# Patient Record
Sex: MALE | Race: WHITE | HISPANIC OR LATINO | Employment: FULL TIME | URBAN - METROPOLITAN AREA
[De-identification: names, ages, dates, MRNs, and addresses within clinical notes are randomized per-mention and may not be internally consistent; named-entity substitution may affect disease eponyms.]

---

## 2017-06-27 ENCOUNTER — OFFICE VISIT (OUTPATIENT)
Dept: URGENT CARE | Facility: CLINIC | Age: 22
End: 2017-06-27
Payer: COMMERCIAL

## 2017-06-27 VITALS
BODY MASS INDEX: 21.47 KG/M2 | HEIGHT: 70 IN | DIASTOLIC BLOOD PRESSURE: 66 MMHG | SYSTOLIC BLOOD PRESSURE: 114 MMHG | WEIGHT: 150 LBS | RESPIRATION RATE: 16 BRPM | OXYGEN SATURATION: 98 % | HEART RATE: 78 BPM | TEMPERATURE: 98.2 F

## 2017-06-27 DIAGNOSIS — H65.04 RECURRENT ACUTE SEROUS OTITIS MEDIA OF RIGHT EAR: ICD-10-CM

## 2017-06-27 PROCEDURE — 99202 OFFICE O/P NEW SF 15 MIN: CPT | Performed by: NURSE PRACTITIONER

## 2017-06-27 RX ORDER — FLUTICASONE PROPIONATE 50 MCG
1 SPRAY, SUSPENSION (ML) NASAL DAILY
Qty: 16 G | Refills: 0 | Status: SHIPPED | OUTPATIENT
Start: 2017-06-27 | End: 2019-08-03

## 2017-06-27 RX ORDER — CEFUROXIME AXETIL 250 MG/1
250 TABLET ORAL 2 TIMES DAILY
COMMUNITY
End: 2019-08-03

## 2017-06-27 RX ORDER — CIPROFLOXACIN HYDROCHLORIDE 3.5 MG/ML
1 SOLUTION/ DROPS TOPICAL
COMMUNITY
End: 2019-08-03

## 2017-06-27 ASSESSMENT — ENCOUNTER SYMPTOMS
MYALGIAS: 0
SORE THROAT: 0
FEVER: 0
COUGH: 0
VERTIGO: 0
VOMITING: 0
DIZZINESS: 0
CHILLS: 0
NAUSEA: 0

## 2017-06-27 NOTE — MR AVS SNAPSHOT
"        Linus Cotton   2017 4:30 PM   Office Visit   MRN: [id number]    Department:  [facility]   Dept Phone:  [phone]    Description:  Male : 1995   Provider:  NATAN Gee           Reason for Visit     Ear Fullness was treated for ear infection but still feels full       Allergies as of 2017     No Known Allergies      You were diagnosed with     Recurrent acute serous otitis media of right ear   [[id number]]         Vital Signs     Blood Pressure Pulse Temperature Respirations Height Weight    114/66 mmHg 78 36.8 °C (98.2 °F) 16 1.778 m (5' 10\") 68.04 kg (150 lb)    Body Mass Index Oxygen Saturation                21.52 kg/m2 98%          Basic Information     Date Of Birth Sex Race Ethnicity Preferred Language    1995 Male White Unknown English      Health Maintenance     Patient has no pending health maintenance at this time      Current Immunizations     No immunizations on file.      Below and/or attached are the medications your provider expects you to take. Review all of your home medications and newly ordered medications with your provider and/or pharmacist. Follow medication instructions as directed by your provider and/or pharmacist. Please keep your medication list with you and share with your provider. Update the information when medications are discontinued, doses are changed, or new medications (including over-the-counter products) are added; and carry medication information at all times in the event of emergency situations     Allergies:  No Known Allergies          Medications  Valid as of: 2017 -  4:43 PM    Generic Name Brand Name Tablet Size Instructions for use    Cefuroxime Axetil (Tab) CEFTIN 250 MG Take 250 mg by mouth 2 times a day.        Ciprofloxacin HCl (Solution) CILOXIN 0.3 % Place 1 Drop in both eyes every 2 hours.        Fluticasone Propionate (Suspension) FLONASE 50 MCG/ACT Spray 1 Spray in nose every day.        .              "      Medicines prescribed today were sent to:     [facility]Y #[id number] - [place], [place] - [address]    [address] [place] [address]    Phone: [phone] Fax: [phone]    Open 24 Hours?: No      Medication refill instructions:       If your prescription bottle indicates you have medication refills left, it is not necessary to call your provider’s office. Please contact your pharmacy and they will refill your medication.    If your prescription bottle indicates you do not have any refills left, you may request refills at any time through one of the following ways: The online JAZIO system (except Urgent Care), by calling your provider’s office, or by asking your pharmacy to contact your provider’s office with a refill request. Medication refills are processed only during regular business hours and may not be available until the next business day. Your provider may request additional information or to have a follow-up visit with you prior to refilling your medication.   *Please Note: Medication refills are assigned a new Rx number when refilled electronically. Your pharmacy may indicate that no refills were authorized even though a new prescription for the same medication is available at the pharmacy. Please request the medicine by name with the pharmacy before contacting your provider for a refill.           JAZIO Access Code: Activation code not generated  Current JAZIO Status: Active

## 2017-06-27 NOTE — PROGRESS NOTES
"Subjective:      Linus Cotton is a 21 y.o. male who presents with Ear Fullness            Ear Fullness  This is a new problem. The current episode started in the past 7 days. The problem occurs constantly. The problem has been unchanged. Pertinent negatives include no chills, congestion, coughing, fever, myalgias, nausea, sore throat, vertigo or vomiting. Associated symptoms comments: +ear pain. Treatments tried: was on ceftin and cipro.     Allergies, medications and history reviewed by me today    Review of Systems   Constitutional: Negative for fever, chills and malaise/fatigue.   HENT: Positive for ear discharge. Negative for congestion, ear pain and sore throat.    Respiratory: Negative for cough.    Gastrointestinal: Negative for nausea and vomiting.   Musculoskeletal: Negative for myalgias.   Neurological: Negative for dizziness and vertigo.          Objective:     /66 mmHg  Pulse 78  Temp(Src) 36.8 °C (98.2 °F)  Resp 16  Ht 1.778 m (5' 10\")  Wt 68.04 kg (150 lb)  BMI 21.52 kg/m2  SpO2 98%     Physical Exam   Constitutional: He is oriented to person, place, and time. He appears well-developed and well-nourished. No distress.   HENT:   Head: Normocephalic and atraumatic.   Right Ear: External ear and ear canal normal. Tympanic membrane is not injected and not perforated. A middle ear effusion is present.   Left Ear: External ear and ear canal normal. Tympanic membrane is not injected and not perforated.  No middle ear effusion.   Nose: No mucosal edema.   Mouth/Throat: No oropharyngeal exudate or posterior oropharyngeal erythema.   Eyes: Conjunctivae are normal. Right eye exhibits no discharge. Left eye exhibits no discharge.   Neck: Normal range of motion. Neck supple.   Cardiovascular: Normal rate, regular rhythm and normal heart sounds.    No murmur heard.  Pulmonary/Chest: Effort normal and breath sounds normal. No respiratory distress.   Musculoskeletal: Normal range of motion.   Normal " movement of all 4 extremities.   Lymphadenopathy:     He has no cervical adenopathy.        Right: No supraclavicular adenopathy present.        Left: No supraclavicular adenopathy present.   Neurological: He is alert and oriented to person, place, and time. Gait normal.   Skin: Skin is warm and dry.   Psychiatric: He has a normal mood and affect. His behavior is normal. Thought content normal.   Nursing note and vitals reviewed.              Assessment/Plan:     1. Recurrent acute serous otitis media of right ear  fluticasone (FLONASE) 50 MCG/ACT nasal spray     Reassurance  flonase daily  Consider OTC antihistamine such as allegra.

## 2017-11-14 ENCOUNTER — OFFICE VISIT (OUTPATIENT)
Dept: URGENT CARE | Facility: CLINIC | Age: 22
End: 2017-11-14
Payer: COMMERCIAL

## 2017-11-14 VITALS
TEMPERATURE: 97.6 F | BODY MASS INDEX: 20.76 KG/M2 | OXYGEN SATURATION: 99 % | DIASTOLIC BLOOD PRESSURE: 88 MMHG | RESPIRATION RATE: 14 BRPM | WEIGHT: 145 LBS | HEIGHT: 70 IN | SYSTOLIC BLOOD PRESSURE: 122 MMHG | HEART RATE: 72 BPM

## 2017-11-14 DIAGNOSIS — T78.40XA ALLERGIC REACTION, INITIAL ENCOUNTER: ICD-10-CM

## 2017-11-14 PROCEDURE — 99214 OFFICE O/P EST MOD 30 MIN: CPT | Mod: 25 | Performed by: NURSE PRACTITIONER

## 2017-11-14 RX ORDER — DEXAMETHASONE SODIUM PHOSPHATE 4 MG/ML
4 INJECTION, SOLUTION INTRA-ARTICULAR; INTRALESIONAL; INTRAMUSCULAR; INTRAVENOUS; SOFT TISSUE ONCE
Status: COMPLETED | OUTPATIENT
Start: 2017-11-14 | End: 2017-11-14

## 2017-11-14 RX ORDER — DIPHENHYDRAMINE HYDROCHLORIDE 50 MG/ML
25 INJECTION INTRAMUSCULAR; INTRAVENOUS ONCE
Status: COMPLETED | OUTPATIENT
Start: 2017-11-14 | End: 2017-11-14

## 2017-11-14 RX ADMIN — DIPHENHYDRAMINE HYDROCHLORIDE 25 MG: 50 INJECTION INTRAMUSCULAR; INTRAVENOUS at 10:19

## 2017-11-14 RX ADMIN — DEXAMETHASONE SODIUM PHOSPHATE 4 MG: 4 INJECTION, SOLUTION INTRA-ARTICULAR; INTRALESIONAL; INTRAMUSCULAR; INTRAVENOUS; SOFT TISSUE at 10:18

## 2017-11-14 ASSESSMENT — ENCOUNTER SYMPTOMS
SORE THROAT: 0
EYE PAIN: 0
NAUSEA: 0
VOMITING: 0
WHEEZING: 0
CHILLS: 0
FEVER: 0
DIZZINESS: 0
SHORTNESS OF BREATH: 0
MYALGIAS: 0

## 2017-11-14 NOTE — PROGRESS NOTES
"Subjective:   Linus Justice a 22 y.o. male who presents for Allergic Reaction (x1day, right had swelling and redness, sensitive and itchy to the touch, stings, poss bug bites, right eye swollen and redness, has happened in the past when redness starts in one location and starts trailing up or around arms)        Allergic Reaction   This is a new problem. The current episode started yesterday. The problem occurs constantly. The problem has been gradually worsening. Pertinent negatives include no chest pain, chills, fever, myalgias, nausea, rash, sore throat or vomiting. Associated symptoms comments: Itching, erythema, swelling. Nothing aggravates the symptoms. Treatments tried: Benadryl x1 dose. The treatment provided no relief.   Patient states that he has had bites like this in the past with a similar reaction. Unsure what is causing reaction. Denies bedbugs.  Review of Systems   Constitutional: Negative for chills and fever.   HENT: Negative for sore throat.    Eyes: Negative for pain.   Respiratory: Negative for shortness of breath and wheezing.    Cardiovascular: Negative for chest pain.   Gastrointestinal: Negative for nausea and vomiting.   Genitourinary: Negative for hematuria.   Musculoskeletal: Negative for myalgias.   Skin: Positive for itching. Negative for rash.   Neurological: Negative for dizziness.     No Known Allergies   Objective:   /88   Pulse 72   Temp 36.4 °C (97.6 °F)   Resp 14   Ht 1.778 m (5' 10\")   Wt 65.8 kg (145 lb)   SpO2 99%   BMI 20.81 kg/m²   Physical Exam   Constitutional: He is oriented to person, place, and time. He appears well-developed and well-nourished. No distress.   HENT:   Head: Normocephalic and atraumatic.       Mouth/Throat: Uvula is midline, oropharynx is clear and moist and mucous membranes are normal. No posterior oropharyngeal erythema. No tonsillar exudate.   Edema, erythema, small urticaria, small vesical noted in center.    Eyes: Conjunctivae and " EOM are normal. Pupils are equal, round, and reactive to light.   Cardiovascular: Normal rate and regular rhythm.    No murmur heard.  Pulmonary/Chest: Effort normal and breath sounds normal. No respiratory distress.   Abdominal: Soft. He exhibits no distension. There is no tenderness.   Neurological: He is alert and oriented to person, place, and time. He has normal reflexes. No sensory deficit.   Skin: Skin is warm and dry. Rash noted. Rash is urticarial. There is erythema.        Psychiatric: He has a normal mood and affect.         Assessment/Plan:   Assessment    1. Allergic reaction, initial encounter  - diphenhydrAMINE (BENADRYL) injection 25 mg; 0.5 mL by Intramuscular route Once.  - dexamethasone (DECADRON) injection 4 mg; 1 mL by Intramuscular route Once.     Advised to take over-the-counter Zantac in conjunction with Benadryl. Patient advised to monitor for signs and symptoms of anaphylaxis. Patient educated to seek emergent care  Patient given precautionary s/sx that mandate immediate follow up and evaluation in the ED. Advised of risks of not doing so.    Patient verbalizing the need for primary care provider. Patient requesting referral today. Patient also requested possible referral for allergen testing for future reference.    DDX, Supportive care, and indications for immediate follow-up discussed with patient.    Instructed to return to clinic or nearest emergency department if we are not available for any change in condition, further concerns, or worsening of symptoms.  The patient demonstrated a good understanding and agreed with the treatment plan.

## 2017-12-26 ENCOUNTER — OFFICE VISIT (OUTPATIENT)
Dept: URGENT CARE | Facility: CLINIC | Age: 22
End: 2017-12-26
Payer: COMMERCIAL

## 2017-12-26 ENCOUNTER — APPOINTMENT (OUTPATIENT)
Dept: RADIOLOGY | Facility: IMAGING CENTER | Age: 22
End: 2017-12-26
Attending: FAMILY MEDICINE
Payer: COMMERCIAL

## 2017-12-26 VITALS
HEART RATE: 78 BPM | TEMPERATURE: 99.1 F | BODY MASS INDEX: 20.76 KG/M2 | HEIGHT: 70 IN | WEIGHT: 145 LBS | RESPIRATION RATE: 16 BRPM | SYSTOLIC BLOOD PRESSURE: 110 MMHG | DIASTOLIC BLOOD PRESSURE: 68 MMHG | OXYGEN SATURATION: 98 %

## 2017-12-26 DIAGNOSIS — R29.898 ELBOW PROBLEM: ICD-10-CM

## 2017-12-26 DIAGNOSIS — M25.511 ACUTE PAIN OF RIGHT SHOULDER: ICD-10-CM

## 2017-12-26 DIAGNOSIS — M25.561 ACUTE PAIN OF RIGHT KNEE: ICD-10-CM

## 2017-12-26 PROCEDURE — 99214 OFFICE O/P EST MOD 30 MIN: CPT | Performed by: FAMILY MEDICINE

## 2017-12-26 PROCEDURE — 73030 X-RAY EXAM OF SHOULDER: CPT | Mod: TC,RT | Performed by: FAMILY MEDICINE

## 2017-12-26 RX ORDER — HYDROCODONE BITARTRATE AND ACETAMINOPHEN 7.5; 325 MG/1; MG/1
1 TABLET ORAL EVERY 8 HOURS PRN
Qty: 20 TAB | Refills: 0 | Status: SHIPPED | OUTPATIENT
Start: 2017-12-26 | End: 2018-01-02

## 2017-12-26 RX ORDER — IBUPROFEN 800 MG/1
800 TABLET ORAL EVERY 8 HOURS PRN
Qty: 20 TAB | Refills: 3 | Status: SHIPPED | OUTPATIENT
Start: 2017-12-26 | End: 2019-08-03

## 2017-12-26 ASSESSMENT — ENCOUNTER SYMPTOMS
FOCAL WEAKNESS: 0
HEMOPTYSIS: 0
FEVER: 0
ORTHOPNEA: 0
CHILLS: 0
SHORTNESS OF BREATH: 0
DIZZINESS: 0

## 2017-12-26 NOTE — PROGRESS NOTES
Subjective:      Linus Cotton is a 22 y.o. male who presents with Shoulder Injury (right shoulder pain after snowboarding X last night )    Chief Complaint   Patient presents with   • Shoulder Injury     right shoulder pain after snowboarding X last night         - This is a very pleasant 22 y.o. male with complaints of pain Rt shoulder s/p fall yesterday whilst snowboarding.     - also mentions ongoing chronic Rt knee pain for years, mainly when going up down stairs sitting. Localized behind knee cap. Also worried that he noticed he has never been able to extend his Rt elbow as much as his Lt elbow. No pain really or inury remembered. He has notices this for years. We discussed Sports med f/u of this ongoing chronic issues.           ALLERGIES:  Patient has no known allergies.     PMH:  History reviewed. No pertinent past medical history.     MEDS:    Current Outpatient Prescriptions:   •  ibuprofen (MOTRIN) 800 MG Tab, Take 1 Tab by mouth every 8 hours as needed., Disp: 20 Tab, Rfl: 3  •  hydrocodone-acetaminophen (NORCO) 7.5-325 MG per tablet, Take 1 Tab by mouth every 8 hours as needed for up to 7 days., Disp: 20 Tab, Rfl: 0  •  DiphenhydrAMINE HCl (BENADRYL PO), Take  by mouth., Disp: , Rfl:   •  cefUROXime (CEFTIN) 250 MG Tab, Take 250 mg by mouth 2 times a day., Disp: , Rfl:   •  ciprofloxacin (CILOXIN) 0.3 % Solution, Place 1 Drop in both eyes every 2 hours., Disp: , Rfl:   •  fluticasone (FLONASE) 50 MCG/ACT nasal spray, Spray 1 Spray in nose every day., Disp: 16 g, Rfl: 0    ** I have documented what I find to be significant in regards to past medical, social, family and surgical history  in my HPI or under PMH/PSH/FH review section, otherwise it is contributory **         HPI    Review of Systems   Constitutional: Negative for chills and fever.   Respiratory: Negative for hemoptysis and shortness of breath.    Cardiovascular: Negative for chest pain and orthopnea.   Neurological: Negative for  "dizziness and focal weakness.          Objective:     /68   Pulse 78   Temp 37.3 °C (99.1 °F)   Resp 16   Ht 1.778 m (5' 10\")   Wt 65.8 kg (145 lb)   SpO2 98%   BMI 20.81 kg/m²      Physical Exam   Constitutional: He appears well-developed. No distress.   HENT:   Head: Normocephalic and atraumatic.   Mouth/Throat: Oropharynx is clear and moist.   Eyes: Conjunctivae are normal.   Neck: Neck supple.   Cardiovascular: Regular rhythm.    No murmur heard.  Pulmonary/Chest: Effort normal. No respiratory distress.   Neurological: He is alert. He exhibits normal muscle tone.   Skin: Skin is warm and dry.   Psychiatric: He has a normal mood and affect. Judgment normal.   Nursing note and vitals reviewed.  Rt shoulder: + bruising and TTP over clavicle w/ deformity palpated. Flex/abd 45deg.             Assessment/Plan:         1. Acute pain of right shoulder  DX-SHOULDER 2+ RIGHT    REFERRAL TO SPORTS MEDICINE    ibuprofen (MOTRIN) 800 MG Tab    hydrocodone-acetaminophen (NORCO) 7.5-325 MG per tablet   2. Acute pain of right knee  REFERRAL TO SPORTS MEDICINE    ibuprofen (MOTRIN) 800 MG Tab   3. Elbow problem  REFERRAL TO SPORTS MEDICINE    ibuprofen (MOTRIN) 800 MG Tab       - RICE/Sling  - Sports med       Dx & d/c instructions discussed w/ patient and/or family members. Follow up w/ Prvt Dr or here in 3-4 days if not getting better, sooner if needed,  ER if worse and UC/PCP unavailable.        Possible side effects (i.e. Rash, GI upset/constipation, sedation, elevation of BP or sugars) of any medications given discussed.                "

## 2017-12-26 NOTE — LETTER
December 26, 2017         Patient: Linus Cotton   YOB: 1995   Date of Visit: 12/26/2017           To Whom it May Concern:    Linus Cotton was seen in my clinic on 12/26/2017. He may return to work with following restrictions: No use Rt arm x 1 week.    If you have any questions or concerns, please don't hesitate to call.        Sincerely,           Landon Alvarez M.D.  Electronically Signed

## 2018-01-22 ENCOUNTER — OFFICE VISIT (OUTPATIENT)
Dept: MEDICAL GROUP | Facility: CLINIC | Age: 23
End: 2018-01-22
Payer: COMMERCIAL

## 2018-01-22 VITALS
DIASTOLIC BLOOD PRESSURE: 70 MMHG | RESPIRATION RATE: 14 BRPM | TEMPERATURE: 98 F | HEIGHT: 70 IN | SYSTOLIC BLOOD PRESSURE: 112 MMHG | BODY MASS INDEX: 20.76 KG/M2 | OXYGEN SATURATION: 97 % | WEIGHT: 145 LBS | HEART RATE: 55 BPM

## 2018-01-22 DIAGNOSIS — S42.021D CLOSED DISPLACED FRACTURE OF SHAFT OF RIGHT CLAVICLE WITH ROUTINE HEALING, SUBSEQUENT ENCOUNTER: ICD-10-CM

## 2018-01-22 PROCEDURE — 99203 OFFICE O/P NEW LOW 30 MIN: CPT | Performed by: FAMILY MEDICINE

## 2018-01-22 NOTE — PROGRESS NOTES
CHIEF COMPLAINT:  Linus Cotton male presenting at the request of Landon Alvarez M.D. for evaluation of Shoulderpain.     Linus Cotton is complaining of right shoulder pain  present for 1 month  Date of injury December 25, 2017 while snowboarding  Caught an edge and landed on an adducted RIGHT shoulder  Pain is at the anterior and deltoid region  Quality is aching  Pain is Non-radiating  Aggravated by movement  Improved with  rest   no prior problems with this shoulder in the past , but has fractured LEFT clavicle in the past   Prior Treatments: seen at  and back in [facility] when visiting family, figure 8 sling and regular sling  Prior studies: X-Ray   Medications tried for pain include: none, had been taking ibuprofen  Mechanical Symptom history: No Locking    REVIEW OF SYSTEMS  No Nausea, No Vomiting, No Chest Pain, No Shortness of Breath, No Dizziness, No Headache    PAST MEDICAL HISTORY:   History reviewed. No pertinent past medical history.    PMH:  has no past medical history on file.  MEDS:   Current Outpatient Prescriptions:   •  ibuprofen (MOTRIN) 800 MG Tab, Take 1 Tab by mouth every 8 hours as needed., Disp: 20 Tab, Rfl: 3  •  DiphenhydrAMINE HCl (BENADRYL PO), Take  by mouth., Disp: , Rfl:   •  cefUROXime (CEFTIN) 250 MG Tab, Take 250 mg by mouth 2 times a day., Disp: , Rfl:   •  ciprofloxacin (CILOXIN) 0.3 % Solution, Place 1 Drop in both eyes every 2 hours., Disp: , Rfl:   •  fluticasone (FLONASE) 50 MCG/ACT nasal spray, Spray 1 Spray in nose every day., Disp: 16 g, Rfl: 0  ALLERGIES: No Known Allergies  SURGHX:   Past Surgical History:   Procedure Laterality Date   • APPENDECTOMY     • HERNIA REPAIR       SOCHX:  reports that he has never smoked. He has never used smokeless tobacco. He reports that he drinks alcohol. He reports that he does not use drugs.  FH: Family history was reviewed, no pertinent findings to report     PHYSICAL EXAM:  /70   Pulse (!) 55   Temp 36.7 °C (98 °F)   " Resp 14   Ht 1.778 m (5' 10\")   Wt 65.8 kg (145 lb)   SpO2 97%   BMI 20.81 kg/m²       well-developed, well-nourished in no apparent distress, alert and oriented x 3.  Gait: normal    Cervical spine:  Range of motion Intact   Spurling's testing is NEGATIVE  Cervical spine tenderness NEGATIVE    Strength testing:     Deltoid, bilateral 5/5  Bicep, bilateral 5/5  Tricep, bilateral 5/5  Wrist Extension, bilateral 5/5  Wrist Flexion, bilateral 5/5  Finger Abduction, bilateral 5/5    Sensation:  INTACT Bilaterally    Reflexes:   Biceps: R 2+/L  2+  Triceps: R 2+/L  2+  Brachial radialis R 2+/L  2+  Foley's testing is Negative Bilaterally  The arms are otherwise neurovascularly intact     Shoulder Exam:    RIGHT Shoulder:  No visible swelling, POSITIVE mild deformity with callus formation the RIGHT mid clavicle with minimal shortening  Range of motion NEARLY INTACT (90% normal)  Tenderness: Non-tender  Empty Can Testing 5/5  Internal Rotation 5/5  External Rotation 5/5  Lift Off Testing 5/5  Impingement testing Becker  Negative  Neer's testing Negative  Apprehension testing Negative  Relocation testing Negative  Linda's Testing Negative  Grind Testing Negative      LEFT Shoulder:  No visible swelling   Range of motion INTACT  Tenderness: Non-tender  Empty Can Testing 5/5  Internal Rotation 5/5  External Rotation 5/5  Lift Off Testing 5/5  Impingement testing Becker  Negative  Neer's testing Negative  Apprehension testing Negative  Relocation testing Negative  Linda's Testing Negative  Grind Testing Negative      Additional Findings: None      1. Closed displaced fracture of shaft of right clavicle with routine healing, subsequent encounter       Patient was seen and treated at urgent care with sling  He then went home, to [place], where he was provided with figure 8 sling and treated for his midclavicular fracture  Currently, he is nontender at the fracture site with minimal pain with nearly full shoulder " range of motion    At this point, recommend sling for comfort for the next 2 weeks  Continue range of motion exercises which were demonstrated the office today  Avoid contact collision sports for another 1-2 months (between February 19, 2018 and March 19, 2018)    No Follow-up on file.                                                                            Results for orders placed in visit on 12/26/17   DX-SHOULDER 2+ RIGHT    Impression Minimally angulated fracture of the mid to distal shaft of the right clavicle.                  done elsewhere and reviewed independently by me    Thank you Landon Alvarez M.D. for allowing me to participate in caring for your patient.

## 2019-08-03 ENCOUNTER — OFFICE VISIT (OUTPATIENT)
Dept: URGENT CARE | Facility: CLINIC | Age: 24
End: 2019-08-03
Payer: COMMERCIAL

## 2019-08-03 ENCOUNTER — APPOINTMENT (OUTPATIENT)
Dept: RADIOLOGY | Facility: IMAGING CENTER | Age: 24
End: 2019-08-03
Attending: PHYSICIAN ASSISTANT
Payer: COMMERCIAL

## 2019-08-03 VITALS
BODY MASS INDEX: 21.7 KG/M2 | RESPIRATION RATE: 15 BRPM | SYSTOLIC BLOOD PRESSURE: 108 MMHG | TEMPERATURE: 97.9 F | WEIGHT: 155 LBS | HEIGHT: 71 IN | DIASTOLIC BLOOD PRESSURE: 66 MMHG | OXYGEN SATURATION: 98 % | HEART RATE: 60 BPM

## 2019-08-03 DIAGNOSIS — S82.51XA CLOSED AVULSION FRACTURE OF MEDIAL MALLEOLUS OF RIGHT TIBIA, INITIAL ENCOUNTER: Primary | ICD-10-CM

## 2019-08-03 DIAGNOSIS — S99.921A INJURY OF RIGHT FOOT, INITIAL ENCOUNTER: ICD-10-CM

## 2019-08-03 PROCEDURE — 73630 X-RAY EXAM OF FOOT: CPT | Mod: TC,RT | Performed by: PHYSICIAN ASSISTANT

## 2019-08-03 PROCEDURE — 73610 X-RAY EXAM OF ANKLE: CPT | Mod: TC,RT | Performed by: PHYSICIAN ASSISTANT

## 2019-08-03 PROCEDURE — 99214 OFFICE O/P EST MOD 30 MIN: CPT | Performed by: PHYSICIAN ASSISTANT

## 2019-08-03 RX ORDER — IBUPROFEN 800 MG/1
800 TABLET ORAL EVERY 8 HOURS PRN
Qty: 30 TAB | Refills: 0 | Status: SHIPPED | OUTPATIENT
Start: 2019-08-03 | End: 2020-11-02

## 2019-08-03 ASSESSMENT — ENCOUNTER SYMPTOMS
VOMITING: 0
JOINT SWELLING: 1
CONSTIPATION: 0
DIARRHEA: 0
TINGLING: 0
NUMBNESS: 0
COUGH: 0
FEVER: 0
CHILLS: 0
ABDOMINAL PAIN: 0
NAUSEA: 0
WEAKNESS: 0
CHANGE IN BOWEL HABIT: 0
SHORTNESS OF BREATH: 0

## 2019-08-03 NOTE — PROGRESS NOTES
"Subjective:   Linus Cotton is a 23 y.o. male who presents for Foot Pain (right foot )        Foot Problem   This is a new (R foot ) problem. The problem occurs constantly. Associated symptoms include joint swelling. Pertinent negatives include no abdominal pain, change in bowel habit, chills, congestion, coughing, fever, nausea, numbness, vomiting or weakness. He has tried acetaminophen, NSAIDs, ice and immobilization for the symptoms. The treatment provided mild relief.     The pt landed on uneven ground while jumping over a wall 2 weeks ago. He was evaluated in ED in [facility] at the time. He had a negative XR of ankle done at that time.     Review of Systems   Constitutional: Negative for chills, fever and malaise/fatigue.   HENT: Negative for congestion.    Respiratory: Negative for cough and shortness of breath.    Gastrointestinal: Negative for abdominal pain, change in bowel habit, constipation, diarrhea, nausea and vomiting.   Musculoskeletal: Positive for joint pain (R ankle) and joint swelling.   Neurological: Negative for tingling, weakness and numbness.   All other systems reviewed and are negative.      PMH:  has no past medical history on file.  MEDS:   Current Outpatient Medications:   •  ibuprofen (MOTRIN) 800 MG Tab, Take 1 Tab by mouth every 8 hours as needed., Disp: 30 Tab, Rfl: 0  •  DiphenhydrAMINE HCl (BENADRYL PO), Take  by mouth., Disp: , Rfl:   ALLERGIES: No Known Allergies  SURGHX:   Past Surgical History:   Procedure Laterality Date   • APPENDECTOMY     • HERNIA REPAIR       SOCHX:  reports that he has never smoked. He has never used smokeless tobacco. He reports that he drinks alcohol. He reports that he does not use drugs.  History reviewed. No pertinent family history.     Objective:   /66   Pulse 60   Temp 36.6 °C (97.9 °F) (Temporal)   Resp 15   Ht 1.803 m (5' 11\")   Wt 70.3 kg (155 lb)   SpO2 98%   BMI 21.62 kg/m²     Physical Exam   Constitutional: He is oriented to " person, place, and time. He appears well-developed and well-nourished. No distress.   HENT:   Head: Normocephalic and atraumatic.   Nose: Nose normal.   Eyes: Pupils are equal, round, and reactive to light. Conjunctivae are normal.   Neck: Normal range of motion. Neck supple. No tracheal deviation present.   Cardiovascular: Normal rate and regular rhythm.   Pulmonary/Chest: Effort normal and breath sounds normal.   Musculoskeletal:        Right ankle: He exhibits decreased range of motion and swelling. He exhibits no ecchymosis. Tenderness. Medial malleolus and AITFL tenderness found. No lateral malleolus, no CF ligament, no posterior TFL, no head of 5th metatarsal and no proximal fibula tenderness found. Achilles tendon exhibits normal Hansen's test results.   Neurological: He is alert and oriented to person, place, and time.   Skin: Skin is warm and dry. Capillary refill takes less than 2 seconds.   Psychiatric: He has a normal mood and affect. His behavior is normal.   Vitals reviewed.         XR Foot:   FINDINGS:  The alignment is grossly normal.  There is no evidence of displaced fracture or dislocation.      Impression       Negative RIGHT foot series.         XR ankle:   FINDINGS:  Small osseous fragments adjacent to the tip of the medial malleolus as well as anterior tibia    The ankle mortise is not widened    No joint arthropathy.      Impression       Small osseous fragments adjacent to the tip of the medial malleolus as well as anterior tibia, likely acute avulsion fractures.           Assessment/Plan:     1. Closed avulsion fracture of medial malleolus of right tibia, initial encounter  REFERRAL TO ORTHOPEDICS    ibuprofen (MOTRIN) 800 MG Tab   2. Injury of right foot, initial encounter  DX-FOOT-COMPLETE 3+ RIGHT    DX-ANKLE 3+ VIEWS RIGHT     Per my read, avulsion of medial malleolus seen on x-ray.  Agree with radiology report.     Patient placed in a nonweightbearing boot.  A referral to follow-up  with orthopedics was placed.  Rest, ice, elevate.  Ankle fracture handout provided.    Follow-up with primary care provider within 7-10 days.  If symptoms worsen or persist patient can return to clinic for reevaluation. All side effects of medication discussed including allergic response, GI upset, tendon injury, etc. Patient verbalized understanding of information.    Please note that this dictation was created using voice recognition software. I have made every reasonable attempt to correct obvious errors, but I expect that there are errors of grammar and possibly content that I did not discover before finalizing the note.

## 2019-08-03 NOTE — PATIENT INSTRUCTIONS
Ankle Fracture  A fracture is a break in a bone. A cast or splint may be used to protect the ankle and heal the break. Sometimes, surgery is needed.  Follow these instructions at home:  · Use crutches as told by your doctor. It is very important that you use your crutches correctly.  · Do not put weight or pressure on the injured ankle until told by your doctor.  · Keep your ankle raised (elevated) when sitting or lying down.  · Apply ice to the ankle:  ¨ Put ice in a plastic bag.  ¨ Place a towel between your cast and the bag.  ¨ Leave the ice on for 20 minutes, 2-3 times a day.  · If you have a plaster or fiberglass cast:  ¨ Do not try to scratch under the cast with any objects.  ¨ Check the skin around the cast every day. You may put lotion on red or sore areas.  ¨ Keep your cast dry and clean.  · If you have a plaster splint:  ¨ Wear the splint as told by your doctor.  ¨ You can loosen the elastic around the splint if your toes get numb, tingle, or turn cold or blue.  · Do not put pressure on any part of your cast or splint. It may break. Rest your plaster splint or cast only on a pillow the first 24 hours until it is fully hardened.  · Cover your cast or splint with a plastic bag during showers.  · Do not lower your cast or splint into water.  · Take medicine as told by your doctor.  · Do not drive until your doctor says it is safe.  · Follow-up with your doctor as told. It is very important that you go to your follow-up visits.  Contact a doctor if:  The swelling and discomfort gets worse.  Get help right away if:  · Your splint or cast breaks.  · You continue to have very bad pain.  · You have new pain or swelling after your splint or cast was put on.  · Your skin or toes below the injured ankle:  ¨ Turn blue or gray.  ¨ Feel cold, numb, or you cannot feel them.  · There is a bad smell or yellowish white fluid (pus) coming from under the splint or cast.  This information is not intended to replace advice  given to you by your health care provider. Make sure you discuss any questions you have with your health care provider.  Document Released: 10/15/2010 Document Revised: 05/25/2017 Document Reviewed: 07/17/2014  Elsevier Interactive Patient Education © 2017 [employer]

## 2019-10-08 ENCOUNTER — HOSPITAL ENCOUNTER (OUTPATIENT)
Dept: LAB | Facility: MEDICAL CENTER | Age: 24
End: 2019-10-08
Attending: NURSE PRACTITIONER
Payer: COMMERCIAL

## 2019-10-08 ENCOUNTER — OFFICE VISIT (OUTPATIENT)
Dept: URGENT CARE | Facility: CLINIC | Age: 24
End: 2019-10-08
Payer: COMMERCIAL

## 2019-10-08 VITALS
RESPIRATION RATE: 16 BRPM | TEMPERATURE: 99.5 F | SYSTOLIC BLOOD PRESSURE: 128 MMHG | HEIGHT: 71 IN | DIASTOLIC BLOOD PRESSURE: 90 MMHG | WEIGHT: 159 LBS | OXYGEN SATURATION: 98 % | BODY MASS INDEX: 22.26 KG/M2 | HEART RATE: 83 BPM

## 2019-10-08 DIAGNOSIS — J02.9 ACUTE PHARYNGITIS, UNSPECIFIED ETIOLOGY: ICD-10-CM

## 2019-10-08 DIAGNOSIS — N50.89 MALE GENITAL LESION: ICD-10-CM

## 2019-10-08 LAB
INT CON NEG: NEGATIVE
INT CON POS: POSITIVE
S PYO AG THROAT QL: NEGATIVE
TREPONEMA PALLIDUM IGG+IGM AB [PRESENCE] IN SERUM OR PLASMA BY IMMUNOASSAY: NON REACTIVE

## 2019-10-08 PROCEDURE — 87880 STREP A ASSAY W/OPTIC: CPT | Performed by: NURSE PRACTITIONER

## 2019-10-08 PROCEDURE — 86780 TREPONEMA PALLIDUM: CPT

## 2019-10-08 PROCEDURE — 99214 OFFICE O/P EST MOD 30 MIN: CPT | Mod: 25 | Performed by: NURSE PRACTITIONER

## 2019-10-08 PROCEDURE — 86694 HERPES SIMPLEX NES ANTBDY: CPT

## 2019-10-08 PROCEDURE — 36415 COLL VENOUS BLD VENIPUNCTURE: CPT

## 2019-10-08 RX ORDER — AMOXICILLIN 500 MG/1
500 CAPSULE ORAL 2 TIMES DAILY
Qty: 20 CAP | Refills: 0 | Status: SHIPPED | OUTPATIENT
Start: 2019-10-08 | End: 2019-10-18

## 2019-10-08 NOTE — PROGRESS NOTES
Chief Complaint   Patient presents with   • Sore Throat     last night, feeling cold, drained from energy, sore throat, hurts to swallow, hot and cold, congestion, progressing x1day   • Personal Problem     notice 3 small bumps on tip of penis       HISTORY OF PRESENT ILLNESS: Patient is a 24 y.o. male who presents today due to complaints of a sore throat for the past two days. Reports associated nasal congestion, fever, chills, pain with swallowing. Denies associated ear pain, rhinitis, cough, or difficulty breathing. He has tried OTC medications at home without much improvement. Denies known ill contacts. In addition, he notes three small bumps to penis. The lesions are non tender or itching. He has tried aloe vera gel for treatment. He admits to two new sexual partners in the past month, one was without protection. No testicle pain or swelling, dysuria, or penile discharge. Denies previous history of the same.     There are no active problems to display for this patient.      Allergies:Patient has no known allergies.    Current Outpatient Medications Ordered in Epic   Medication Sig Dispense Refill   • ibuprofen (MOTRIN) 800 MG Tab Take 1 Tab by mouth every 8 hours as needed. 30 Tab 0   • DiphenhydrAMINE HCl (BENADRYL PO) Take  by mouth.       No current Epic-ordered facility-administered medications on file.        History reviewed. No pertinent past medical history.    Social History     Tobacco Use   • Smoking status: Never Smoker   • Smokeless tobacco: Never Used   Substance Use Topics   • Alcohol use: Yes     Comment: occ   • Drug use: No       No family status information on file.   History reviewed. No pertinent family history.    ROS:  Review of Systems   Constitutional: Positive for fever, chills. Negative for weight loss, malaise, and fatigue.   HENT: Positive for sore throat. Negative for ear pain, nosebleeds, congestion.   Eyes: Negative for vision changes.   Cardiovascular: Negative for chest pain,  "palpitations, orthopnea and leg swelling.   Respiratory: Negative for cough, sputum production, shortness of breath and wheezing.   Gastrointestinal: Negative for abdominal pain, nausea, vomiting or diarrhea.   : Positive for bumps to penis. Negative for changes in urination.   Skin: Negative for rash, diaphoresis.     Exam:  /90   Pulse 83   Temp 37.5 °C (99.5 °F)   Resp 16   Ht 1.803 m (5' 11\")   Wt 72.1 kg (159 lb)   SpO2 98%   General: well-nourished, well-developed male in NAD  Head: normocephalic, atraumatic  Eyes: PERRLA, no conjunctival injection, acuity grossly intact, lids normal.  Ears: normal shape and symmetry, no tenderness, no discharge. External canals are without any significant edema or erythema. Tympanic membranes are without any inflammation, no effusion. Gross auditory acuity is intact.  Nose: symmetrical without tenderness, no discharge.  Mouth/Throat: reasonable hygiene. There is erythema, without exudates or tonsillar enlargement present.  Neck: no masses, range of motion within normal limits, no tracheal deviation. No obvious thyroid enlargement.   Lymph: no cervical adenopathy. No supraclavicular adenopathy.   Neuro: alert and oriented. Cranial nerves 1-12 grossly intact. No sensory deficit.   Cardiovascular: regular rate and rhythm. No edema.  Pulmonary: no distress. Chest is symmetrical with respiration, no wheezes, crackles, or rhonchi.   Musculoskeletal: no clubbing, appropriate muscle tone, gait is stable.  Skin: warm, dry, intact, no clubbing, no cyanosis.  There proximally 5 small 1 mm papules noted to the head of the patient's penis, areas nontender or draining.  No surrounding erythema or swelling.  : There is small papules noted to the head of penis, otherwise penis is normal in appearance.  Testicles also appeared normal in appearance.  Psych: appropriate mood, affect, judgement.         Assessment/Plan:  1. Acute pharyngitis, unspecified etiology  POCT Rapid " Strep A    amoxicillin (AMOXIL) 500 MG Cap   2. Male genital lesion  RPR (SYPHILIS)    HSV I/II IGG & IGM SERUM           POC strep negative      POC strep is negative in office today, nevertheless antibiotic as directed for suspected bacterial process. OTC motrin or tylenol for pain/fever control. Hand hygiene. Increase fluid intake, rest. Warm salt water gargles.  Regarding penile lesions, I discussed viral potential such as HPV.  I will also test the patient for syphilis and HSV.  Safe sex practices discussed.  I will call patient with results.  Supportive care, differential diagnoses, and indications for immediate follow-up discussed with patient.   Pathogenesis of diagnosis discussed including typical length and natural progression.   Instructed to return to clinic or nearest emergency department for any change in condition, further concerns, or worsening of symptoms.  Patient states understanding of the plan of care and discharge instructions.  Instructed to make an appointment, to establish care and for follow up, with a primary care provider.        Please note that this dictation was created using voice recognition software. I have made every reasonable attempt to correct obvious errors, but I expect that there are errors of grammar and possibly content that I did not discover before finalizing the note.      GAUTAM Gonzalez.

## 2019-10-11 LAB
HSV1+2 IGG SER IA-ACNC: 0.22 IV
HSV1+2 IGM SER IA-ACNC: 0.53 IV

## 2019-10-12 ENCOUNTER — TELEPHONE (OUTPATIENT)
Dept: URGENT CARE | Facility: PHYSICIAN GROUP | Age: 24
End: 2019-10-12

## 2019-10-12 NOTE — TELEPHONE ENCOUNTER
HSV and RPR negative. The patient was called for re-evaluation, a detailed message was left, instructed to follow up with PCP. Encouraged to call back to the clinic or return to clinic with any questions or concerns.       GAUTAM Gonzalez.

## 2020-06-26 ENCOUNTER — HOSPITAL ENCOUNTER (OUTPATIENT)
Dept: RADIOLOGY | Facility: MEDICAL CENTER | Age: 25
End: 2020-06-26
Attending: PODIATRIST
Payer: COMMERCIAL

## 2020-06-26 DIAGNOSIS — S93.411A SPRAIN OF CALCANEOFIBULAR LIGAMENT OF RIGHT ANKLE, INITIAL ENCOUNTER: ICD-10-CM

## 2020-06-26 DIAGNOSIS — M79.671 FOOT PAIN, RIGHT: ICD-10-CM

## 2020-06-26 PROCEDURE — 73721 MRI JNT OF LWR EXTRE W/O DYE: CPT | Mod: RT

## 2020-08-04 ENCOUNTER — HOSPITAL ENCOUNTER (OUTPATIENT)
Dept: RADIOLOGY | Facility: MEDICAL CENTER | Age: 25
End: 2020-08-04
Attending: ORTHOPAEDIC SURGERY
Payer: COMMERCIAL

## 2020-08-04 DIAGNOSIS — M25.571 RIGHT ANKLE PAIN, UNSPECIFIED CHRONICITY: ICD-10-CM

## 2020-08-04 DIAGNOSIS — M25.371 OTHER INSTABILITY, RIGHT ANKLE: ICD-10-CM

## 2020-08-04 PROCEDURE — 73700 CT LOWER EXTREMITY W/O DYE: CPT | Mod: RT

## 2020-11-02 ENCOUNTER — OFFICE VISIT (OUTPATIENT)
Dept: URGENT CARE | Facility: CLINIC | Age: 25
End: 2020-11-02
Payer: COMMERCIAL

## 2020-11-02 VITALS
OXYGEN SATURATION: 98 % | DIASTOLIC BLOOD PRESSURE: 74 MMHG | TEMPERATURE: 97.7 F | RESPIRATION RATE: 12 BRPM | HEART RATE: 80 BPM | BODY MASS INDEX: 21.7 KG/M2 | HEIGHT: 71 IN | SYSTOLIC BLOOD PRESSURE: 120 MMHG | WEIGHT: 155 LBS

## 2020-11-02 DIAGNOSIS — K64.5 THROMBOSED HEMORRHOIDS: ICD-10-CM

## 2020-11-02 PROCEDURE — 99213 OFFICE O/P EST LOW 20 MIN: CPT | Performed by: FAMILY MEDICINE

## 2020-11-02 RX ORDER — DOCUSATE SODIUM 100 MG/1
100 CAPSULE, LIQUID FILLED ORAL 2 TIMES DAILY
COMMUNITY

## 2020-11-02 ASSESSMENT — ENCOUNTER SYMPTOMS
MYALGIAS: 0
BRUISES/BLEEDS EASILY: 0
WEIGHT LOSS: 0

## 2020-11-02 NOTE — LETTER
November 2, 2020         Patient: Linus Cotton   YOB: 1995   Date of Visit: 11/2/2020           To Whom it May Concern:    Linus Cotton was seen in my clinic on 11/2/2020. Please excuse 11/3/2020.      Sincerely,           Alex Muniz M.D.  Electronically Signed

## 2020-11-03 NOTE — PROGRESS NOTES
"Subjective:      Linus Cotton is a 25 y.o. male who presents with Other (Onset 10/30/2020, the patient was in [place], had hemorrhoid and had to go to the hospital in [place] to get it drained. Here now for a follow up and to ensure it is healing well. )            Seen in a [place] ED 10/30/2020 with incision and evacuation of thrombosed hemorrhoid.  Pain much improved at that time and continues to improve.   No purulent discharge.  No difficulty with bowel movements but he notes he has not had a completely normal one at this point.  He is using a stool softener.  No fever.  Past medical history is notable for hemorrhoid requiring surgery when he was 17 years old.  No other aggravating or alleviating factors.      Review of Systems   Constitutional: Negative for malaise/fatigue and weight loss.   Musculoskeletal: Negative for joint pain and myalgias.   Skin: Negative for itching and rash.   Endo/Heme/Allergies: Does not bruise/bleed easily.     .  Medications, Allergies, and current problem list reviewed today in Epic       Objective:     /74 (BP Location: Left arm, Patient Position: Sitting, BP Cuff Size: Adult)   Pulse 80   Temp 36.5 °C (97.7 °F) (Temporal)   Resp 12   Ht 1.803 m (5' 11\")   Wt 70.3 kg (155 lb)   SpO2 98%   BMI 21.62 kg/m²      Physical Exam  Constitutional:       Appearance: Normal appearance.   Genitourinary:     Comments: 2.5 cm round thrombosed hemorrhoid with central incision.  No active bleeding.  No evidence of infection.  Skin:     General: Skin is warm and dry.   Neurological:      General: No focal deficit present.      Mental Status: He is oriented to person, place, and time.                 Assessment/Plan:         1. Thrombosed hemorrhoids  REFERRAL TO GENERAL SURGERY     Differential diagnosis, natural history, supportive care, and indications for immediate follow-up discussed at length.     Overall appears to be doing well however there is still a significant " portion thrombosed.  He understands at this will likely resolve spontaneously however given his history of requiring hemorrhoid surgery at a young age I will place another surgery referral.

## 2021-04-09 ENCOUNTER — OFFICE VISIT (OUTPATIENT)
Dept: URGENT CARE | Facility: CLINIC | Age: 26
End: 2021-04-09
Payer: OTHER MISCELLANEOUS

## 2021-04-09 ENCOUNTER — HOSPITAL ENCOUNTER (OUTPATIENT)
Facility: MEDICAL CENTER | Age: 26
End: 2021-04-09
Attending: NURSE PRACTITIONER
Payer: OTHER MISCELLANEOUS

## 2021-04-09 VITALS
RESPIRATION RATE: 16 BRPM | OXYGEN SATURATION: 98 % | TEMPERATURE: 98 F | WEIGHT: 159 LBS | BODY MASS INDEX: 22.26 KG/M2 | HEIGHT: 71 IN | DIASTOLIC BLOOD PRESSURE: 68 MMHG | SYSTOLIC BLOOD PRESSURE: 112 MMHG | HEART RATE: 70 BPM

## 2021-04-09 DIAGNOSIS — J30.2 SEASONAL ALLERGIES: ICD-10-CM

## 2021-04-09 DIAGNOSIS — J02.9 PHARYNGITIS, UNSPECIFIED ETIOLOGY: ICD-10-CM

## 2021-04-09 PROBLEM — S93.401S: Status: ACTIVE | Noted: 2019-11-20

## 2021-04-09 LAB
INT CON NEG: NORMAL
INT CON POS: NORMAL
S PYO AG THROAT QL: NEGATIVE

## 2021-04-09 PROCEDURE — U0005 INFEC AGEN DETEC AMPLI PROBE: HCPCS

## 2021-04-09 PROCEDURE — 87880 STREP A ASSAY W/OPTIC: CPT | Performed by: NURSE PRACTITIONER

## 2021-04-09 PROCEDURE — U0003 INFECTIOUS AGENT DETECTION BY NUCLEIC ACID (DNA OR RNA); SEVERE ACUTE RESPIRATORY SYNDROME CORONAVIRUS 2 (SARS-COV-2) (CORONAVIRUS DISEASE [COVID-19]), AMPLIFIED PROBE TECHNIQUE, MAKING USE OF HIGH THROUGHPUT TECHNOLOGIES AS DESCRIBED BY CMS-2020-01-R: HCPCS

## 2021-04-09 PROCEDURE — 99213 OFFICE O/P EST LOW 20 MIN: CPT | Performed by: NURSE PRACTITIONER

## 2021-04-09 ASSESSMENT — ENCOUNTER SYMPTOMS
FEVER: 0
TROUBLE SWALLOWING: 0
HEADACHES: 1
SHORTNESS OF BREATH: 0
SWOLLEN GLANDS: 0
VOMITING: 0
DIARRHEA: 0
COUGH: 0
SORE THROAT: 1

## 2021-04-09 NOTE — PROGRESS NOTES
Subjective:     Linus Cotton is a 25 y.o. male who presents for Sore Throat (sore throat x 3days , headaches x yesterday )      Started Wednesday night with a sore throat. Intermittent HA yesterday. Throat pain greater in AM. No known COVID exposure. COVID in November. Fully vaccinated. Hx of seasonal allergies, has not started sneezing as is typical with past allergies.      Pharyngitis   This is a new problem. The current episode started in the past 7 days. The problem has been waxing and waning. The pain is worse on the right (SLightly more on right.) side. There has been no fever. The pain is at a severity of 5/10. The pain is moderate. Associated symptoms include congestion and headaches. Pertinent negatives include no coughing, diarrhea, ear pain, shortness of breath, swollen glands, trouble swallowing or vomiting. He has had no exposure to strep. He has tried nothing for the symptoms.       No past medical history on file.    Past Surgical History:   Procedure Laterality Date   • APPENDECTOMY     • HERNIA REPAIR         Social History     Socioeconomic History   • Marital status: Single     Spouse name: Not on file   • Number of children: Not on file   • Years of education: Not on file   • Highest education level: Not on file   Occupational History   • Not on file   Tobacco Use   • Smoking status: Never Smoker   • Smokeless tobacco: Never Used   Substance and Sexual Activity   • Alcohol use: Yes     Comment: occ   • Drug use: No   • Sexual activity: Not on file   Other Topics Concern   • Not on file   Social History Narrative   • Not on file     Social Determinants of Health     Financial Resource Strain:    • Difficulty of Paying Living Expenses:    Food Insecurity:    • Worried About Running Out of Food in the Last Year:    • Ran Out of Food in the Last Year:    Transportation Needs:    • Lack of Transportation (Medical):    • Lack of Transportation (Non-Medical):    Physical Activity:    • Days of  "Exercise per Week:    • Minutes of Exercise per Session:    Stress:    • Feeling of Stress :    Social Connections:    • Frequency of Communication with Friends and Family:    • Frequency of Social Gatherings with Friends and Family:    • Attends [employer] Services:    • Active Member of Clubs or Organizations:    • Attends Club or Organization Meetings:    • Marital Status:    Intimate Partner Violence:    • Fear of Current or Ex-Partner:    • Emotionally Abused:    • Physically Abused:    • Sexually Abused:         No family history on file.     No Known Allergies    Review of Systems   Constitutional: Negative for fever.   HENT: Positive for congestion and sore throat. Negative for ear pain and trouble swallowing.    Respiratory: Negative for cough and shortness of breath.    Cardiovascular: Negative for chest pain.   Gastrointestinal: Negative for diarrhea and vomiting.   Neurological: Positive for headaches.   Endo/Heme/Allergies: Positive for environmental allergies.   All other systems reviewed and are negative.       Objective:   /68 (BP Location: Left arm, Patient Position: Sitting, BP Cuff Size: Adult)   Pulse 70   Temp 36.7 °C (98 °F) (Temporal)   Resp 16   Ht 1.803 m (5' 11\")   Wt 72.1 kg (159 lb)   SpO2 98%   BMI 22.18 kg/m²     Physical Exam  Vitals reviewed.   Constitutional:       General: He is not in acute distress.     Appearance: He is well-developed. He is not toxic-appearing.   HENT:      Head: Normocephalic and atraumatic.      Right Ear: External ear normal.      Left Ear: External ear normal.      Nose: Congestion present.      Right Sinus: No maxillary sinus tenderness or frontal sinus tenderness.      Left Sinus: No maxillary sinus tenderness or frontal sinus tenderness.      Mouth/Throat:      Lips: Pink.      Mouth: Mucous membranes are moist. No oral lesions.      Palate: No lesions.      Pharynx: Posterior oropharyngeal erythema present. No pharyngeal swelling or uvula " swelling.      Tonsils: 0 on the right. 0 on the left.      Comments: Clear post nasal drip, cobblestoning.   Eyes:      Conjunctiva/sclera: Conjunctivae normal.   Cardiovascular:      Rate and Rhythm: Normal rate.   Pulmonary:      Effort: Pulmonary effort is normal. No respiratory distress.      Breath sounds: Normal breath sounds. No stridor. No wheezing, rhonchi or rales.   Musculoskeletal:         General: Normal range of motion.      Cervical back: Normal range of motion and neck supple.   Skin:     General: Skin is warm and dry.      Findings: No rash.   Neurological:      General: No focal deficit present.      Mental Status: He is alert and oriented to person, place, and time.      GCS: GCS eye subscore is 4. GCS verbal subscore is 5. GCS motor subscore is 6.   Psychiatric:         Mood and Affect: Mood normal.         Speech: Speech normal.         Behavior: Behavior normal.         Thought Content: Thought content normal.         Judgment: Judgment normal.         Assessment/Plan:   1. Pharyngitis, unspecified etiology  - POCT Rapid Strep A  - COVID/SARS CoV-2; Future    2. Seasonal allergies  -Nasal spray and allergy medications as directed (Zyrtec or Loratadine).  -You may try saline irrigation or neti pot.   Results for orders placed or performed in visit on 04/09/21   POCT Rapid Strep A   Result Value Ref Range    Rapid Strep Screen negative     Internal Control Positive Valid     Internal Control Negative Valid    -Oral Hydration.  -Warm salt water gargles.  -OTC Throat lozenges or spray (Cepacol).  -Tylenol and Motrin as directed for pain and fever.  -Hand Hygiene: Wash hands frequently with soap and water.    Follow up for persistent throat pain, increased swelling, persistent fevers, difficulty swallowing, shortness of breath, weakness, elevated heart rate, or any other concerns.     Discussed viral pharyngitis. COVID testing to return to work. COVID less likely, Pt has been fully vaccinated, with  hx of COVID last year.     Differential diagnosis, natural history, supportive care, and indications for immediate follow-up discussed.

## 2021-04-09 NOTE — PATIENT INSTRUCTIONS
-Oral Hydration.  -Warm salt water gargles.  -OTC Throat lozenges or spray (Cepacol).  -Tylenol and Motrin as directed for pain and fever.  -Hand Hygiene: Wash hands frequently with soap and water.  -Nasal spray and allergy medications as directed (Zyrtec or Loratadine).  -You may try saline irrigation or neti pot.     Follow up for persistent throat pain, increased swelling, persistent fevers, difficulty swallowing, shortness of breath, weakness, elevated heart rate, or any other concerns.     Pharyngitis    Pharyngitis is redness, pain, and swelling (inflammation) of the throat (pharynx). It is a very common cause of sore throat. Pharyngitis can be caused by a bacteria, but it is usually caused by a virus. Most cases of pharyngitis get better on their own without treatment.  What are the causes?  This condition may be caused by:  · Infection by viruses (viral). Viral pharyngitis spreads from person to person (is contagious) through coughing, sneezing, and sharing of personal items or utensils such as cups, forks, spoons, and toothbrushes.  · Infection by bacteria (bacterial). Bacterial pharyngitis may be spread by touching the nose or face after coming in contact with the bacteria, or through more intimate contact, such as kissing.  · Allergies. Allergies can cause buildup of mucus in the throat (post-nasal drip), leading to inflammation and irritation. Allergies can also cause blocked nasal passages, forcing breathing through the mouth, which dries and irritates the throat.  What increases the risk?  You are more likely to develop this condition if:  · You are 5-24 years old.  · You are exposed to crowded environments such as , school, or dormitory living.  · You live in a cold climate.  · You have a weakened disease-fighting (immune) system.  What are the signs or symptoms?  Symptoms of this condition vary by the cause (viral, bacterial, or allergies) and can include:  · Sore  throat.  · Fatigue.  · Low-grade fever.  · Headache.  · Joint pain and muscle aches.  · Skin rashes.  · Swollen glands in the throat (lymph nodes).  · Plaque-like film on the throat or tonsils. This is often a symptom of bacterial pharyngitis.  · Vomiting.  · Stuffy nose (nasal congestion).  · Cough.  · Red, itchy eyes (conjunctivitis).  · Loss of appetite.  How is this diagnosed?  This condition is often diagnosed based on your medical history and a physical exam. Your health care provider will ask you questions about your illness and your symptoms. A swab of your throat may be done to check for bacteria (rapid strep test). Other lab tests may also be done, depending on the suspected cause, but these are rare.  How is this treated?  This condition usually gets better in 3-4 days without medicine. Bacterial pharyngitis may be treated with antibiotic medicines.  Follow these instructions at home:  · Take over-the-counter and prescription medicines only as told by your health care provider.  ? If you were prescribed an antibiotic medicine, take it as told by your health care provider. Do not stop taking the antibiotic even if you start to feel better.  ? Do not give children aspirin because of the association with Reye syndrome.  · Drink enough water and fluids to keep your urine clear or pale yellow.  · Get a lot of rest.  · Gargle with a salt-water mixture 3-4 times a day or as needed. To make a salt-water mixture, completely dissolve ½-1 tsp of salt in 1 cup of warm water.  · If your health care provider approves, you may use throat lozenges or sprays to soothe your throat.  Contact a health care provider if:  · You have large, tender lumps in your neck.  · You have a rash.  · You cough up green, yellow-brown, or bloody spit.  Get help right away if:  · Your neck becomes stiff.  · You drool or are unable to swallow liquids.  · You cannot drink or take medicines without vomiting.  · You have severe pain that does  not go away, even after you take medicine.  · You have trouble breathing, and it is not caused by a stuffy nose.  · You have new pain and swelling in your joints such as the knees, ankles, wrists, or elbows.  Summary  · Pharyngitis is redness, pain, and swelling (inflammation) of the throat (pharynx).  · While pharyngitis can be caused by a bacteria, the most common causes are viral.  · Most cases of pharyngitis get better on their own without treatment.  · Bacterial pharyngitis is treated with antibiotic medicines.  This information is not intended to replace advice given to you by your health care provider. Make sure you discuss any questions you have with your health care provider.  Document Released: 12/18/2006 Document Revised: 11/30/2018 Document Reviewed: 01/23/2018  Elsevier Patient Education © 2020 [employer]    Allergic Rhinitis, Adult  Allergic rhinitis is an allergic reaction that affects the mucous membrane inside the nose. It causes sneezing, a runny or stuffy nose, and the feeling of mucus going down the back of the throat (postnasal drip). Allergic rhinitis can be mild to severe.  There are two types of allergic rhinitis:  · Seasonal. This type is also called hay fever. It happens only during certain seasons.  · Perennial. This type can happen at any time of the year.  What are the causes?  This condition happens when the body's defense system (immune system) responds to certain harmless substances called allergens as though they were germs.   Seasonal allergic rhinitis is triggered by pollen, which can come from grasses, trees, and weeds. Perennial allergic rhinitis may be caused by:  · House dust mites.  · Pet dander.  · Mold spores.  What are the signs or symptoms?  Symptoms of this condition include:  · Sneezing.  · Runny or stuffy nose (nasal congestion).  · Postnasal drip.  · Itchy nose.  · Tearing of the eyes.  · Trouble sleeping.  · Daytime sleepiness.  How is this diagnosed?  This  condition may be diagnosed based on:  · Your medical history.  · A physical exam.  · Tests to check for related conditions, such as:  ? Asthma.  ? Pink eye.  ? Ear infection.  ? Upper respiratory infection.  · Tests to find out which allergens trigger your symptoms. These may include skin or blood tests.  How is this treated?  There is no cure for this condition, but treatment can help control symptoms. Treatment may include:  · Taking medicines that block allergy symptoms, such as antihistamines. Medicine may be given as a shot, nasal spray, or pill.  · Avoiding the allergen.  · Desensitization. This treatment involves getting ongoing shots until your body becomes less sensitive to the allergen. This treatment may be done if other treatments do not help.  · If taking medicine and avoiding the allergen does not work, new, stronger medicines may be prescribed.  Follow these instructions at home:  · Find out what you are allergic to. Common allergens include smoke, dust, and pollen.  · Avoid the things you are allergic to. These are some things you can do to help avoid allergens:  ? Replace carpet with wood, tile, or vinyl fariha. Carpet can trap dander and dust.  ? Do not smoke. Do not allow smoking in your home.  ? Change your heating and air conditioning filter at least once a month.  ? During allergy season:  § Keep windows closed as much as possible.  § Plan outdoor activities when pollen counts are lowest. This is usually during the evening hours.  § When coming indoors, change clothing and shower before sitting on furniture or bedding.  · Take over-the-counter and prescription medicines only as told by your health care provider.  · Keep all follow-up visits as told by your health care provider. This is important.  Contact a health care provider if:  · You have a fever.  · You develop a persistent cough.  · You make whistling sounds when you breathe (you wheeze).  · Your symptoms interfere with your normal  daily activities.  Get help right away if:  · You have shortness of breath.  Summary  · This condition can be managed by taking medicines as directed and avoiding allergens.  · Contact your health care provider if you develop a persistent cough or fever.  · During allergy season, keep windows closed as much as possible.  This information is not intended to replace advice given to you by your health care provider. Make sure you discuss any questions you have with your health care provider.  Document Released: 09/12/2002 Document Revised: 11/30/2018 Document Reviewed: 01/25/2018  Elsevier Patient Education © 2020 [employer]

## 2021-04-10 DIAGNOSIS — J02.9 PHARYNGITIS, UNSPECIFIED ETIOLOGY: ICD-10-CM

## 2021-04-10 LAB
COVID ORDER STATUS COVID19: NORMAL
SARS-COV-2 RNA RESP QL NAA+PROBE: NOTDETECTED
SPECIMEN SOURCE: NORMAL

## 2022-04-15 ENCOUNTER — OFFICE VISIT (OUTPATIENT)
Dept: URGENT CARE | Facility: CLINIC | Age: 27
End: 2022-04-15
Payer: OTHER MISCELLANEOUS

## 2022-04-15 VITALS
OXYGEN SATURATION: 94 % | RESPIRATION RATE: 18 BRPM | BODY MASS INDEX: 22.24 KG/M2 | SYSTOLIC BLOOD PRESSURE: 118 MMHG | HEART RATE: 100 BPM | HEIGHT: 72 IN | WEIGHT: 164.2 LBS | DIASTOLIC BLOOD PRESSURE: 72 MMHG | TEMPERATURE: 98.5 F

## 2022-04-15 DIAGNOSIS — J02.9 EXUDATIVE PHARYNGITIS: ICD-10-CM

## 2022-04-15 LAB
INT CON NEG: NORMAL
INT CON POS: NORMAL
S PYO AG THROAT QL: NEGATIVE

## 2022-04-15 PROCEDURE — 99213 OFFICE O/P EST LOW 20 MIN: CPT | Performed by: NURSE PRACTITIONER

## 2022-04-15 PROCEDURE — 87880 STREP A ASSAY W/OPTIC: CPT | Performed by: NURSE PRACTITIONER

## 2022-04-15 RX ORDER — AMOXICILLIN 500 MG/1
500 CAPSULE ORAL 2 TIMES DAILY
Qty: 20 CAPSULE | Refills: 0 | Status: SHIPPED | OUTPATIENT
Start: 2022-04-15 | End: 2022-04-25

## 2022-04-15 ASSESSMENT — ENCOUNTER SYMPTOMS
COUGH: 0
FEVER: 1
WEAKNESS: 0
SORE THROAT: 1
HEADACHES: 0
SENSORY CHANGE: 0
SHORTNESS OF BREATH: 0
NEUROLOGICAL NEGATIVE: 1
RESPIRATORY NEGATIVE: 1
MYALGIAS: 1

## 2022-04-15 ASSESSMENT — VISUAL ACUITY: OU: 1

## 2022-04-15 NOTE — PROGRESS NOTES
Subjective:     Linus Cotton is a 26 y.o. male who presents for Pharyngitis (Ear clogged/headaches/body aches/sinus pressure/nasal congestion/x1day)       Pharyngitis   This is a new problem. The problem has been gradually worsening. Associated symptoms include congestion and ear pain. Pertinent negatives include no coughing, headaches (Improved) or shortness of breath. Treatments tried: Ibuprofen, amoxicillin from past episode of strep. The treatment provided significant relief.     Patient was screened prior to rooming and denied COVID-19 diagnosis or contact with a person who has been diagnosed or is suspected to have COVID-19. During this visit, appropriate PPE was worn, hand hygiene was performed, and the patient and any visitors were masked.     PMH:  has no past medical history on file.    MEDS:   Current Outpatient Medications:   •  amoxicillin (AMOXIL) 500 MG Cap, Take 1 Capsule by mouth 2 times a day for 10 days., Disp: 20 Capsule, Rfl: 0  •  docusate sodium (COLACE) 100 MG Cap, Take 100 mg by mouth 2 times a day., Disp: , Rfl:   •  HYDROCORTISONE-PRAMOXINE-ALOE EX, 1 Each by Apply externally route as needed. (Patient not taking: Reported on 4/15/2022), Disp: , Rfl:     ALLERGIES: No Known Allergies    SURGHX:   Past Surgical History:   Procedure Laterality Date   • APPENDECTOMY     • HERNIA REPAIR       SOCHX:  reports that he has never smoked. He has never used smokeless tobacco. He reports current alcohol use. He reports that he does not use drugs.     FH: Reviewed with patient, not pertinent to this visit.    Review of Systems   Constitutional: Positive for fever.   HENT: Positive for congestion, ear pain and sore throat.    Respiratory: Negative.  Negative for cough and shortness of breath.    Musculoskeletal: Positive for myalgias.   Neurological: Negative.  Negative for sensory change, weakness and headaches (Improved).   All other systems reviewed and are negative.    Additional details per  "HPI.      Objective:     /72 (BP Location: Left arm, Patient Position: Sitting)   Pulse 100   Temp 36.9 °C (98.5 °F) (Temporal)   Resp 18   Ht 1.832 m (6' 0.13\")   Wt 74.5 kg (164 lb 3.2 oz)   SpO2 94%   BMI 22.19 kg/m²     Physical Exam  Vitals reviewed.   Constitutional:       General: He is not in acute distress.     Appearance: He is well-developed. He is not ill-appearing or toxic-appearing.   HENT:      Right Ear: Tympanic membrane normal. Tympanic membrane is not injected, perforated or bulging.      Left Ear: Tympanic membrane is injected (Mild). Tympanic membrane is not perforated or bulging.      Mouth/Throat:      Mouth: Mucous membranes are moist.      Pharynx: Uvula midline. Pharyngeal swelling, oropharyngeal exudate and posterior oropharyngeal erythema present.   Eyes:      General: Vision grossly intact.      Extraocular Movements: Extraocular movements intact.      Conjunctiva/sclera: Conjunctivae normal.   Cardiovascular:      Rate and Rhythm: Normal rate.   Pulmonary:      Effort: Pulmonary effort is normal. No respiratory distress.   Musculoskeletal:         General: No deformity. Normal range of motion.      Cervical back: Normal range of motion.   Lymphadenopathy:      Cervical: No cervical adenopathy.   Skin:     General: Skin is warm and dry.      Coloration: Skin is not pale.   Neurological:      Mental Status: He is alert and oriented to person, place, and time.      Sensory: No sensory deficit.      Motor: No weakness.   Psychiatric:         Behavior: Behavior normal. Behavior is cooperative.     Rapid Strep A swab: negative      Assessment/Plan:     1. Exudative pharyngitis  - POCT Rapid Strep A  - amoxicillin (AMOXIL) 500 MG Cap; Take 1 Capsule by mouth 2 times a day for 10 days.  Dispense: 20 Capsule; Refill: 0    Rx as above sent electronically.    Differential diagnosis, natural history, supportive care, rest, fluids, gargles, over-the-counter symptom management per " 's instructions, ibuprofen, APAP, close monitoring, and indications for immediate follow-up discussed.     Vital signs stable, afebrile, no acute distress at this time. Warning signs reviewed. Return precautions discussed.     All questions answered. Patient agrees with the plan of care.    [facility]

## 2024-09-14 ENCOUNTER — APPOINTMENT (OUTPATIENT)
Dept: URGENT CARE | Facility: CLINIC | Age: 29
End: 2024-09-14
Payer: COMMERCIAL